# Patient Record
Sex: MALE | Race: WHITE | Employment: FULL TIME | URBAN - METROPOLITAN AREA
[De-identification: names, ages, dates, MRNs, and addresses within clinical notes are randomized per-mention and may not be internally consistent; named-entity substitution may affect disease eponyms.]

---

## 2020-05-22 ENCOUNTER — TELEPHONE (OUTPATIENT)
Dept: RHEUMATOLOGY | Facility: CLINIC | Age: 35
End: 2020-05-22

## 2020-05-22 ENCOUNTER — TELEMEDICINE (OUTPATIENT)
Dept: RHEUMATOLOGY | Facility: CLINIC | Age: 35
End: 2020-05-22
Payer: COMMERCIAL

## 2020-05-22 DIAGNOSIS — Z51.81 THERAPEUTIC DRUG MONITORING: ICD-10-CM

## 2020-05-22 DIAGNOSIS — M06.9 RHEUMATOID ARTHRITIS, INVOLVING UNSPECIFIED SITE, UNSPECIFIED RHEUMATOID FACTOR PRESENCE: Primary | ICD-10-CM

## 2020-05-22 PROCEDURE — 99204 OFFICE O/P NEW MOD 45 MIN: CPT | Performed by: INTERNAL MEDICINE

## 2020-05-22 RX ORDER — FOLIC ACID 1 MG/1
1 TABLET ORAL DAILY
Qty: 90 TABLET | Refills: 3 | Status: SHIPPED | OUTPATIENT
Start: 2020-05-22 | End: 2021-05-22

## 2020-05-22 NOTE — TELEPHONE ENCOUNTER
Future Appointments   Date Time Provider Keanu Mcclellan   5/26/2020  2:00 PM MD Jelly Shearer [facility]   6/24/2020 10:00 AM Marvel Cervantes MD [address] [place] [address]

## 2020-05-22 NOTE — PATIENT INSTRUCTIONS
1. Increase methotrexate to 7 tablets a week   2. Start folic acid 1mg a day   3. advil as needed  4.  Return to clinic in 4 weeks - June 24th at 10 am.   5. Check labs on next visit -

## 2020-05-22 NOTE — PROGRESS NOTES
This visit is conducted using Telemedicine with live, interactive video and audio. Patient understands and accepts financial responsibility for any deductible, co-insurance and/or co-pays associated with this service.     Telehealth outside of [facility]  T unspecified site, unspecified rheumatoid factor presence  Abnormal Labs: +RHEUMATOID FACTOR  .   HPI:     I had the pleasure of seeing Cayla Payne on 5/22/2020 for evaluation.      He is a pleasant 37year old who has been having ongoing polyarthralg Dispense Refill   • methotrexate 2.5 MG Oral Tab Take 12.5 mg by mouth once a week. • methylPREDNISolone (MEDROL) 4 MG Oral Tablet Therapy Pack As directed. (Patient not taking: Reported on 5/22/2020 ) 1 kit 1      History reviewed.  No pertinent past m PERRLA, pleasant, no acute distress,  no neck tendnerness, good ROM,   No rashes  CVS and RS : chest symmetrical, no dyspnea  ABD: Soft   Joint exam:   Can raise left hsoulder - tender in ac joint upon abduction   Mild tender in mcps, pips in hands , can c 6 - 22 (calc) NOT APPLICABLE   SODIUM, SERUM      135 - 146 mmol/L 137   POTASSIUM, SERUM      3.5 - 5.3 mmol/L 4.3   CHLORIDE, SERUM      98 - 110 mmol/L 104   CARBON DIOXIDE      20 - 32 mmol/L 29   CALCIUM      8.6 - 10.3 mg/dL 9.4   PROTEIN, TOTAL Mathew Toro MD  5/22/2020  1:41 PM    Please note that the following visit was completed using two-way, real-time interactive audio and/or video communication. This was done with patient consent.    This has been done in good neda to provide c

## 2020-06-24 ENCOUNTER — OFFICE VISIT (OUTPATIENT)
Dept: RHEUMATOLOGY | Facility: CLINIC | Age: 35
End: 2020-06-24
Payer: COMMERCIAL

## 2020-06-24 VITALS
HEIGHT: 64 IN | SYSTOLIC BLOOD PRESSURE: 113 MMHG | WEIGHT: 130 LBS | BODY MASS INDEX: 22.2 KG/M2 | DIASTOLIC BLOOD PRESSURE: 76 MMHG | HEART RATE: 78 BPM

## 2020-06-24 DIAGNOSIS — Z51.81 THERAPEUTIC DRUG MONITORING: ICD-10-CM

## 2020-06-24 DIAGNOSIS — E55.9 VITAMIN D DEFICIENCY: ICD-10-CM

## 2020-06-24 DIAGNOSIS — M06.9 RHEUMATOID ARTHRITIS, INVOLVING UNSPECIFIED SITE, UNSPECIFIED RHEUMATOID FACTOR PRESENCE: Primary | ICD-10-CM

## 2020-06-24 PROCEDURE — 99214 OFFICE O/P EST MOD 30 MIN: CPT | Performed by: INTERNAL MEDICINE

## 2020-06-24 RX ORDER — OMEPRAZOLE 20 MG/1
20 CAPSULE, DELAYED RELEASE ORAL
COMMUNITY
End: 2021-10-05 | Stop reason: ALTCHOICE

## 2020-06-24 NOTE — PATIENT INSTRUCTIONS
1. Stay on methotrexate 7 tablets a week   2. Cont. folic acid 1mg a day   3. advil as needed  4. Return to clinic in 3 months -   5. Check labs in 1 month -   6.  Consider sulfasalazine 500mg twice a day   Sulfasalazine tablets  Brand Names: Azulfidine, Peace is almost time for your next dose, take only that dose. Do not take double or extra doses. Where should I keep my medicine? Keep out of the reach of children. Store at room temperature between 15 and 30 degrees C (59 and 86 degrees F).  Keep container ti

## 2020-06-24 NOTE — PROGRESS NOTES
Jackie Murillo is a 37year old male who presents for Patient presents with: Follow - Up  Joint Pain  Hand Pain: swelling  Foot Pain  Shoulder Pain  . HPI:     I had the pleasure of seeing Jackie Murillo on 5/22/2020 for evaluation.      He is the beginning, the increase caused indigestion. He has felt like he is dragging. He has 1/10 pain. He has morning stiffness for a couple of hours. He has only taken ibuprofen 1-2 times ad ay.        Wt Readings from Last 2 Encounters:  06/24/20 : 13 no temple pain  Eyes: No visual changes,   CVS: No chest pain, no heart disease  RS: No SOB, no Cough, occl Pleurtic pain in chest - not really bad,   GI: No nausea, no vomiiting, no abominal pain, no hx of ulcer, getting heartburn, no dyshpagia, no BRBPR EOSINOPHILS      15 - 500 cells/uL 191   ABSOLUTE BASOPHILS      0 - 200 cells/uL 40   ABSOLUTE BLASTS      0 cells/uL CANCELED   ABSOLUTE NUCLEATED RBC      0 cells/uL CANCELED   NEUTROPHILS      % 59.3   BAND NEUTROPHILS      % CANCELED   Metamyelocytes enough   - increased to 17.5mg ad ay and fa 1mg a day - will stay at this dose for another 1 month -   - consider sulfasalazine 500mg twice a day -   The risks and benefits of sulfsalazine therapy were discussed at length with the patient, including the po

## 2020-07-29 NOTE — TELEPHONE ENCOUNTER
LOV: 6/24/20  Last Refilled:#35, 1rf 5/22/20  Labs:AST 15 ALT 11  5/11/20      Future Appointments   Date Time Provider Keanu Mcclellan   9/29/2020  2:30 PM Carlos Ladd MD [facility]       Please advise.

## 2020-10-20 NOTE — TELEPHONE ENCOUNTER
Last filled: 7/29/2020 #35 tab with 1 refill   LOV: 6/24/20  No future appointments. Labs: 7/28/20  Summary:  1. Stay on methotrexate 7 tablets a week   2. Cont. folic acid 1mg a day   3. advil as needed  4. Return to clinic in 3 months -   5.  Check labs

## 2020-11-13 ENCOUNTER — LAB ENCOUNTER (OUTPATIENT)
Dept: LAB | Age: 35
End: 2020-11-13
Attending: INTERNAL MEDICINE
Payer: COMMERCIAL

## 2020-11-13 DIAGNOSIS — Z20.828 EXPOSURE TO SARS-ASSOCIATED CORONAVIRUS: ICD-10-CM

## 2020-12-19 RX ORDER — METHOTREXATE 2.5 MG/1
TABLET ORAL
Qty: 35 TABLET | Refills: 0 | Status: SHIPPED | OUTPATIENT
Start: 2020-12-19 | End: 2021-01-11

## 2020-12-19 NOTE — TELEPHONE ENCOUNTER
Requested Prescriptions     Pending Prescriptions Disp Refills   • METHOTREXATE SODIUM 2.5 MG Oral Tab [Pharmacy Med Name: METHOTREXATE 2.5 MG TABLET] 35 tablet 0     Sig: TAKE 7 TABLETS BY MOUTH ONCE A WEEK     LF: 10/20/20 #35 TAB W/ 0 RF  LOV: 6/24/20       > OR = 60 mL/min/1.73m2 138   Bun/Creatinine Ratio      6 - 22 (calc) NOT APPLICABLE   SODIUM, SERUM      135 - 146 mmol/L 137   POTASSIUM, SERUM      3.5 - 5.3 mmol/L 4.3   CHLORIDE, SERUM      98 - 110 mmol/L 104   CARBON DIOXIDE

## 2020-12-29 PROBLEM — M13.0 POLYARTHRITIS: Status: ACTIVE | Noted: 2020-12-29

## 2020-12-29 PROBLEM — D63.8 ANEMIA OF CHRONIC DISEASE: Status: ACTIVE | Noted: 2020-12-29

## 2020-12-29 PROBLEM — M06.9 RHEUMATOID ARTHRITIS (HCC): Status: ACTIVE | Noted: 2020-12-29

## 2021-01-11 ENCOUNTER — OFFICE VISIT (OUTPATIENT)
Dept: RHEUMATOLOGY | Facility: CLINIC | Age: 36
End: 2021-01-11
Payer: COMMERCIAL

## 2021-01-11 VITALS
HEART RATE: 69 BPM | SYSTOLIC BLOOD PRESSURE: 112 MMHG | DIASTOLIC BLOOD PRESSURE: 74 MMHG | HEIGHT: 64 IN | RESPIRATION RATE: 16 BRPM | WEIGHT: 134 LBS | BODY MASS INDEX: 22.88 KG/M2

## 2021-01-11 DIAGNOSIS — M06.9 RHEUMATOID ARTHRITIS, INVOLVING UNSPECIFIED SITE, UNSPECIFIED WHETHER RHEUMATOID FACTOR PRESENT (HCC): Primary | ICD-10-CM

## 2021-01-11 DIAGNOSIS — Z51.81 THERAPEUTIC DRUG MONITORING: ICD-10-CM

## 2021-01-11 PROCEDURE — 3078F DIAST BP <80 MM HG: CPT | Performed by: INTERNAL MEDICINE

## 2021-01-11 PROCEDURE — 99214 OFFICE O/P EST MOD 30 MIN: CPT | Performed by: INTERNAL MEDICINE

## 2021-01-11 PROCEDURE — 3008F BODY MASS INDEX DOCD: CPT | Performed by: INTERNAL MEDICINE

## 2021-01-11 PROCEDURE — 3074F SYST BP LT 130 MM HG: CPT | Performed by: INTERNAL MEDICINE

## 2021-01-11 RX ORDER — METHOTREXATE 2.5 MG/1
17.5 TABLET ORAL WEEKLY
Qty: 105 TABLET | Refills: 1 | Status: SHIPPED | OUTPATIENT
Start: 2021-01-11 | End: 2021-10-05 | Stop reason: ALTCHOICE

## 2021-01-11 RX ORDER — METHYLPREDNISOLONE 4 MG/1
TABLET ORAL
Qty: 1 PACKAGE | Refills: 0 | Status: SHIPPED | OUTPATIENT
Start: 2021-01-11 | End: 2021-01-13

## 2021-01-11 NOTE — PATIENT INSTRUCTIONS
1. Stay on methotrexate 7 tablets a week   2. Cont. folic acid 1mg a day   3. advil as needed  4. Return to clinic in 4   5. Check labs  Every 3-4 months.    6. Start medrol christina   - ok to get labs at [facility] - please just let me know and me ro dr. Mere Hicks can c

## 2021-01-11 NOTE — PROGRESS NOTES
Alfonso Perez is a 38year old male who presents for Patient presents with:  Rheumatoid Arthritis  Lab Results  Medication Follow-Up  . HPI:     I had the pleasure of seeing Alfonso Perez on 5/22/2020 for evaluation.      He is a pleasant 37 y the increase caused indigestion. He has felt like he is dragging. He has 1/10 pain. He has morning stiffness for a couple of hours. He has only taken ibuprofen 1-2 times ad ay.     1/11/2021  He's feeling ok.  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[facility]:   Review Of Systems:  Fatigue  Constitutional:No fever, no change in weight or appetitie  Derm: No rashes, no 32.0 - 36.0 g/dL 33.4   RDW      11.0 - 15.0 % 12.7   Platelet Count      436 - 400 Thousand/uL 206   MPV      7.5 - 12.5 fL 10.9   NEUTROPHILS ABSOLUTE      1,500 - 7,800 cells/uL 3,914   ABSOLUTE BAND NEUTROPHILS      0 - 750 cells/uL CANCELED   ABSOLU NEGATIVE NEGATIVE   RHEUMATOID FACTOR      <14 IU/mL 105 (H)   SED RATE BY MODIFIED$WESTERGREN      < OR = 15 mm/h 2   CYCLIC CITRULLINATED$PEPTIDE (CCP) AB (IGG)      UNITS >250 (H)       Component      Latest Ref Rng & Units 12/29/2020 12/29/2020 g/dL     Albumin      3.6 - 5.1 g/dL     Globulin, Total      1.9 - 3.7 g/dL (calc)     ALBUMIN/GLOBULIN RATIO      1.0 - 2.5 (calc)     Total Bilirubin      0.2 - 1.2 mg/dL     Alkaline Phosphatase      36 - 130 U/L     AST      10 - 40 U/L     ALT (SGPT) Metamyelocytes      %  CANCELED   MYELOCYTES      %  CANCELED   PROMYELOCYTES      %  CANCELED   LYMPHOCYTES      %  22.5   REACTIVE LYMPHOCYTES      0 - 10 %  CANCELED   MONOCYTES      %  11.6   EOSINOPHILS      %  1.4   BASOPHILS      %  0.5   BLASTS 2mm/hr. tsh is 2.88  ASSESSMENT AND PLAN:   Nicky Lazaro is a 38year old male who presents for Patient presents with:  Rheumatoid Arthritis  Lab Results  Medication Follow-Up      1.  Seropositive rheumatoid arthritis - positive RF and CCP   [place]

## 2021-09-27 ENCOUNTER — TELEPHONE (OUTPATIENT)
Dept: RHEUMATOLOGY | Facility: CLINIC | Age: 36
End: 2021-09-27

## 2021-09-27 NOTE — TELEPHONE ENCOUNTER
Attempted to call no answer. Patient can complete all the labs at any [place] locations. There are standing orders in place. No fasting required.

## 2021-09-27 NOTE — TELEPHONE ENCOUNTER
Pt calling asking if he should complete all the labs on his chart currently and if Dr needs him to do any other labs. Please advise.

## 2021-10-11 ENCOUNTER — OFFICE VISIT (OUTPATIENT)
Dept: RHEUMATOLOGY | Facility: CLINIC | Age: 36
End: 2021-10-11
Payer: COMMERCIAL

## 2021-10-11 VITALS
SYSTOLIC BLOOD PRESSURE: 99 MMHG | HEART RATE: 65 BPM | WEIGHT: 132 LBS | DIASTOLIC BLOOD PRESSURE: 69 MMHG | RESPIRATION RATE: 16 BRPM | HEIGHT: 64 IN | BODY MASS INDEX: 22.53 KG/M2

## 2021-10-11 DIAGNOSIS — M06.9 RHEUMATOID ARTHRITIS, INVOLVING UNSPECIFIED SITE, UNSPECIFIED WHETHER RHEUMATOID FACTOR PRESENT (HCC): Primary | ICD-10-CM

## 2021-10-11 DIAGNOSIS — Z51.81 THERAPEUTIC DRUG MONITORING: ICD-10-CM

## 2021-10-11 DIAGNOSIS — D72.819 LEUKOPENIA, UNSPECIFIED TYPE: ICD-10-CM

## 2021-10-11 PROCEDURE — 3078F DIAST BP <80 MM HG: CPT | Performed by: INTERNAL MEDICINE

## 2021-10-11 PROCEDURE — 3074F SYST BP LT 130 MM HG: CPT | Performed by: INTERNAL MEDICINE

## 2021-10-11 PROCEDURE — 3008F BODY MASS INDEX DOCD: CPT | Performed by: INTERNAL MEDICINE

## 2021-10-11 PROCEDURE — 99214 OFFICE O/P EST MOD 30 MIN: CPT | Performed by: INTERNAL MEDICINE

## 2021-10-11 RX ORDER — METHYLPREDNISOLONE 4 MG/1
TABLET ORAL
Qty: 1 EACH | Refills: 0 | Status: SHIPPED | OUTPATIENT
Start: 2021-10-11 | End: 2021-11-03

## 2021-10-11 NOTE — PROGRESS NOTES
Cayla Payne is a 43year old male who presents for Patient presents with:  Rheumatoid Arthritis  Lab Results  Joint Pain: hands, feet, elbows, and wrist  .   HPI:     I had the pleasure of seeing Alexandra Kapoor on 5/22/2020 for evaluation. tired.   In the beginning, the increase caused indigestion. He has felt like he is dragging. He has 1/10 pain. He has morning stiffness for a couple of hours. He has only taken ibuprofen 1-2 times ad ay.     1/11/2021  He's feeling ok.  But the last Smoker        Packs/day: 0.50        Years: 15.00        Pack years: 7.5      Smokeless tobacco: Never Used    Vaping Use      Vaping Use: Never used    Alcohol use:  Yes      Alcohol/week: 4.0 standard drinks      Types: 4 Standard drinks or equivalent per Rng & Units 5/11/2020   WHITE BLOOD CELL COUNT      3.8 - 10.8 Thousand/uL 6.6   RED BLOOD CELL COUNT      4.20 - 5.80 Million/uL 5.10   Hemoglobin      13.2 - 17.1 g/dL 15.0   Hematocrit      38.5 - 50.0 % 44.9   MCV      80.0 - 100.0 fL 88.0   MCH      2 3.7 g/dL (calc) 2.5   ALBUMIN/GLOBULIN RATIO      1.0 - 2.5 (calc) 1.7   Total Bilirubin      0.2 - 1.2 mg/dL 0.9   Alkaline Phosphatase      36 - 130 U/L 65   AST      10 - 40 U/L 15   ALT (SGPT)      9 - 46 U/L 11   C-REACTIVE PROTEIN      <8.0 mg/L 7.1 mL/min/1.73m2     Bun/Creatinine Ratio      6 - 22 (calc)     SODIUM, SERUM      135 - 146 mmol/L     POTASSIUM, SERUM      3.5 - 5.3 mmol/L     CHLORIDE, SERUM      98 - 110 mmol/L     CARBON DIOXIDE      20 - 32 mmol/L     CALCIUM      8.6 - 10.3 mg/dL MONOCYTES      200 - 950 cells/uL  650   ABSOLUTE EOSINOPHILS      15 - 500 cells/uL  78   ABSOLUTE BASOPHILS      0 - 200 cells/uL  28   ABSOLUTE BLASTS      0 cells/uL  CANCELED   ABSOLUTE NUCLEATED RBC      0 cells/uL  CANCELED   NEUTROPHILS      %  64 (H)   SED RATE BY MODIFIED$WESTERGREN      < OR = 15 mm/h  2   C-REACTIVE PROTEIN      <8.0 mg/L  4.9     Component      Latest Ref Rng & Units 10/5/2021   WHITE BLOOD CELL COUNT      3.8 - 10.8 Thousand/uL 3.2 (L)   RED BLOOD CELL COUNT      4.20 - 5.80 M 21   CALCIUM      8.6 - 10.3 mg/dL 8.7   PROTEIN, TOTAL      6.1 - 8.1 g/dL 6.3   Albumin      3.6 - 5.1 g/dL 3.8   Globulin, Total      1.9 - 3.7 g/dL (calc) 2.5   ALBUMIN/GLOBULIN RATIO      1.0 - 2.5 (calc) 1.5   Total Bilirubin      0.2 - 1.2 mg/dL 0.4 return to clinic in 6-8 weeks.     - d/w him about biologis as well - had long d/w him that if he wants - can consider this - but he is comfortable  restarting methotrexate since it worked for him - will try a lower dose to avoid SE of nausea  - asked him t

## 2021-10-11 NOTE — PATIENT INSTRUCTIONS
1. Medrol christina   2. Repeat wbc  Count. In 3 weeks. 3. If normal then will restart methotrexate 4 tablets a week x 2 weeks, then 6 tablets a week   4. Return to clinic in 6-8 weeks. 5. Check labs again in 8 weeks.

## 2021-11-01 RX ORDER — METHYLPREDNISOLONE 4 MG/1
TABLET ORAL
Qty: 1 EACH | Refills: 0 | OUTPATIENT
Start: 2021-11-01

## 2021-11-03 RX ORDER — METHYLPREDNISOLONE 4 MG/1
TABLET ORAL
Qty: 1 EACH | Refills: 0 | Status: SHIPPED | OUTPATIENT
Start: 2021-11-03 | End: 2021-11-24 | Stop reason: ALTCHOICE

## 2021-11-03 NOTE — TELEPHONE ENCOUNTER
Please let him know I sent in a medrol christina -   He should get labs at least 2 weeks after taking this

## 2021-11-10 RX ORDER — METHYLPREDNISOLONE 4 MG/1
TABLET ORAL
Qty: 21 EACH | Refills: 0 | OUTPATIENT
Start: 2021-11-10

## 2021-12-20 ENCOUNTER — OFFICE VISIT (OUTPATIENT)
Dept: RHEUMATOLOGY | Facility: CLINIC | Age: 36
End: 2021-12-20
Payer: COMMERCIAL

## 2021-12-20 VITALS
HEART RATE: 81 BPM | DIASTOLIC BLOOD PRESSURE: 76 MMHG | SYSTOLIC BLOOD PRESSURE: 114 MMHG | BODY MASS INDEX: 22.88 KG/M2 | HEIGHT: 64 IN | WEIGHT: 134 LBS

## 2021-12-20 DIAGNOSIS — Z51.81 THERAPEUTIC DRUG MONITORING: ICD-10-CM

## 2021-12-20 DIAGNOSIS — M06.9 RHEUMATOID ARTHRITIS, INVOLVING UNSPECIFIED SITE, UNSPECIFIED WHETHER RHEUMATOID FACTOR PRESENT (HCC): Primary | ICD-10-CM

## 2021-12-20 PROCEDURE — 3078F DIAST BP <80 MM HG: CPT | Performed by: INTERNAL MEDICINE

## 2021-12-20 PROCEDURE — 3008F BODY MASS INDEX DOCD: CPT | Performed by: INTERNAL MEDICINE

## 2021-12-20 PROCEDURE — 99214 OFFICE O/P EST MOD 30 MIN: CPT | Performed by: INTERNAL MEDICINE

## 2021-12-20 PROCEDURE — 3074F SYST BP LT 130 MM HG: CPT | Performed by: INTERNAL MEDICINE

## 2021-12-20 RX ORDER — PREDNISONE 1 MG/1
TABLET ORAL
Qty: 37 TABLET | Refills: 0 | Status: SHIPPED | OUTPATIENT
Start: 2021-12-20

## 2021-12-20 RX ORDER — METHYLPREDNISOLONE 4 MG/1
TABLET ORAL
Qty: 1 EACH | Refills: 0 | Status: SHIPPED | OUTPATIENT
Start: 2021-12-20

## 2021-12-20 RX ORDER — FOLIC ACID 1 MG/1
1 TABLET ORAL DAILY
Qty: 90 TABLET | Refills: 3 | Status: SHIPPED | OUTPATIENT
Start: 2021-12-20 | End: 2022-12-20

## 2021-12-20 NOTE — PATIENT INSTRUCTIONS
1.  Will plan on enbrel 50mg a week - info given    2. Cont. methotrexate 7 tablets a week   3. Return to clinic in 8-12 weeks. 4 Check labs again in 8 -12weeks.    5.medrol christina, then  Prednisone 10mg a day x 1 week, then 5mg a day x 3 weeks, then off missed dose. Return to your normal dosing schedule. Do not take 2 doses of this medicine at one time. Please tell your doctor and pharmacist about all the medicines you take. Include both prescription and over-the-counter medicines.  Also tell them about a stop any other medicines without first speaking to your doctor or pharmacist.  Call your doctor right away if you notice any unusual bleeding or bruising. Used needles and syringes should be thrown away properly in a medical waste container.  Ask your doct tiredness or weakness  · unsteadiness while walking  · difficulty or discomfort urinating  · blurring or changes of vision  A few people may have an allergic reactions to this medicine.  Symptoms can include difficulty breathing, skin rash, itching, swellin

## 2021-12-20 NOTE — PROGRESS NOTES
Ana M Senior is a 43year old male who presents for Patient presents with:  Medication Follow-Up: 2 weeks on Methotrexate  Joint Pain  Test Results  . HPI:     I had the pleasure of seeing Ana M Senior on 5/22/2020 for evaluation.      He is the beginning, the increase caused indigestion. He has felt like he is dragging. He has 1/10 pain. He has morning stiffness for a couple of hours. He has only taken ibuprofen 1-2 times ad ay.     1/11/2021  He's feeling ok.  But the last 2-3 weeks h celecoxib (CELEBREX) 200 MG Oral Cap Take 1 capsule (200 mg total) by mouth 2 (two) times daily. 60 capsule 1      No past medical history on file. No past surgical history on file.    Family History   Problem Relation Age of Onset   • Cancer Father    • Soft abd,   Joint exam:   B/l shoulders tender at this time , can raise them but with difficulty   he has mild  swellign in left 2nd and 3rd pip.   Has mild swelling in right 4th pips -    Can close hands but very stiff - not tender right now - janeth suazo eGFR       > OR = 60 mL/min/1.73m2 138   Bun/Creatinine Ratio      6 - 22 (calc) NOT APPLICABLE   SODIUM, SERUM      135 - 146 mmol/L 137   POTASSIUM, SERUM      3.5 - 5.3 mmol/L 4.3   CHLORIDE, SERUM      98 - 110 mmol/L 104   [place] NEUTROPHILS      % CANCELED   Metamyelocytes      % CANCELED   MYELOCYTES      % CANCELED   PROMYELOCYTES      % CANCELED   LYMPHOCYTES      % 31.5   REACTIVE LYMPHOCYTES      0 - 10 % CANCELED   MONOCYTES      % 15.7   EOSINOPHILS      % 2.5   BASOPHILS 10.8 Thousand/uL 5.2   RBC      4.20 - 5.80 Million/uL 4.68   Hemoglobin      13.2 - 17.1 g/dL 13.9   Hematocrit      38.5 - 50.0 % 41.0   MCV      80.0 - 100.0 fL 87.6   MCH      27.0 - 33.0 pg 29.7   MCHC      32.0 - 36.0 g/dL 33.9   RDW      11.0 - 15. 0 0.2 - 1.2 mg/dL 0.7   Alkaline Phosphatase      36 - 130 U/L 73   AST (SGOT)      10 - 40 U/L 12   ALT (SGPT)      9 - 46 U/L 8 (L)   IRON, TOTAL      50 - 180 mcg/dL 83   FERRITIN      38 - 380 ng/mL 195   HEMOGLOBIN A1c      <5.7 % of total Hgb 5.3   Vi Summary:  1. Will plan on enbrel 50mg a week - info given - he wants to try this after 2 months. 2. Cont. methotrexate 7 tablets a week   3. Return to clinic in 8 -12weeks. 4 Check labs again in 8 -12 weeks.    5.medrol christina, then  Prednisone 10mg

## 2022-10-13 ENCOUNTER — LAB ENCOUNTER (OUTPATIENT)
Dept: LAB | Age: 37
End: 2022-10-13
Attending: INTERNAL MEDICINE
Payer: COMMERCIAL

## 2022-10-13 DIAGNOSIS — R53.83 OTHER FATIGUE: ICD-10-CM

## 2022-10-13 DIAGNOSIS — E53.8 VITAMIN B12 DEFICIENCY: ICD-10-CM

## 2022-10-13 DIAGNOSIS — M05.79 RHEUMATOID ARTHRITIS INVOLVING MULTIPLE SITES WITH POSITIVE RHEUMATOID FACTOR (HCC): ICD-10-CM

## 2022-10-13 DIAGNOSIS — R10.9 INTRACTABLE ABDOMINAL PAIN: ICD-10-CM

## 2022-10-13 LAB
ALBUMIN SERPL-MCNC: 3.7 G/DL (ref 3.4–5)
ALBUMIN/GLOB SERPL: 1.1 {RATIO} (ref 1–2)
ALP LIVER SERPL-CCNC: 65 U/L
ALT SERPL-CCNC: 20 U/L
ANION GAP SERPL CALC-SCNC: 8 MMOL/L (ref 0–18)
AST SERPL-CCNC: 14 U/L (ref 15–37)
BASOPHILS # BLD AUTO: 0.02 X10(3) UL (ref 0–0.2)
BASOPHILS NFR BLD AUTO: 0.4 %
BILIRUB SERPL-MCNC: 1 MG/DL (ref 0.1–2)
BUN BLD-MCNC: 8 MG/DL (ref 7–18)
BUN/CREAT SERPL: 13.1 (ref 10–20)
CALCIUM BLD-MCNC: 8.4 MG/DL (ref 8.5–10.1)
CHLORIDE SERPL-SCNC: 109 MMOL/L (ref 98–112)
CHOLEST SERPL-MCNC: 129 MG/DL (ref ?–200)
CO2 SERPL-SCNC: 24 MMOL/L (ref 21–32)
CREAT BLD-MCNC: 0.61 MG/DL
CRP SERPL-MCNC: <0.29 MG/DL (ref ?–0.3)
DEPRECATED RDW RBC AUTO: 41.8 FL (ref 35.1–46.3)
EOSINOPHIL # BLD AUTO: 0.04 X10(3) UL (ref 0–0.7)
EOSINOPHIL NFR BLD AUTO: 0.7 %
ERYTHROCYTE [DISTWIDTH] IN BLOOD BY AUTOMATED COUNT: 12.9 % (ref 11–15)
ERYTHROCYTE [SEDIMENTATION RATE] IN BLOOD: 7 MM/HR
FASTING PATIENT LIPID ANSWER: YES
FASTING STATUS PATIENT QL REPORTED: YES
GFR SERPLBLD BASED ON 1.73 SQ M-ARVRAT: 125 ML/MIN/1.73M2 (ref 60–?)
GLOBULIN PLAS-MCNC: 3.4 G/DL (ref 2.8–4.4)
GLUCOSE BLD-MCNC: 104 MG/DL (ref 70–99)
HBV CORE AB SERPL QL IA: NONREACTIVE
HBV SURFACE AG SER-ACNC: <0.1 [IU]/L
HBV SURFACE AG SERPL QL IA: NONREACTIVE
HCT VFR BLD AUTO: 41.5 %
HCV AB SERPL QL IA: NONREACTIVE
HDLC SERPL-MCNC: 41 MG/DL (ref 40–59)
HGB BLD-MCNC: 14.2 G/DL
IMM GRANULOCYTES # BLD AUTO: 0.01 X10(3) UL (ref 0–1)
IMM GRANULOCYTES NFR BLD: 0.2 %
LDLC SERPL CALC-MCNC: 70 MG/DL (ref ?–100)
LYMPHOCYTES # BLD AUTO: 1.48 X10(3) UL (ref 1–4)
LYMPHOCYTES NFR BLD AUTO: 27.6 %
MCH RBC QN AUTO: 30.6 PG (ref 26–34)
MCHC RBC AUTO-ENTMCNC: 34.2 G/DL (ref 31–37)
MCV RBC AUTO: 89.4 FL
MONOCYTES # BLD AUTO: 0.42 X10(3) UL (ref 0.1–1)
MONOCYTES NFR BLD AUTO: 7.8 %
NEUTROPHILS # BLD AUTO: 3.4 X10 (3) UL (ref 1.5–7.7)
NEUTROPHILS # BLD AUTO: 3.4 X10(3) UL (ref 1.5–7.7)
NEUTROPHILS NFR BLD AUTO: 63.3 %
NONHDLC SERPL-MCNC: 88 MG/DL (ref ?–130)
OSMOLALITY SERPL CALC.SUM OF ELEC: 291 MOSM/KG (ref 275–295)
PLATELET # BLD AUTO: 225 10(3)UL (ref 150–450)
POTASSIUM SERPL-SCNC: 3.4 MMOL/L (ref 3.5–5.1)
PROT SERPL-MCNC: 7.1 G/DL (ref 6.4–8.2)
RBC # BLD AUTO: 4.64 X10(6)UL
SODIUM SERPL-SCNC: 141 MMOL/L (ref 136–145)
TRIGL SERPL-MCNC: 92 MG/DL (ref 30–149)
TSI SER-ACNC: 1.02 MIU/ML (ref 0.36–3.74)
VIT B12 SERPL-MCNC: 567 PG/ML (ref 193–986)
VLDLC SERPL CALC-MCNC: 14 MG/DL (ref 0–30)
WBC # BLD AUTO: 5.4 X10(3) UL (ref 4–11)

## 2022-10-13 PROCEDURE — 86480 TB TEST CELL IMMUN MEASURE: CPT | Performed by: INTERNAL MEDICINE

## 2022-10-13 PROCEDURE — 85652 RBC SED RATE AUTOMATED: CPT | Performed by: INTERNAL MEDICINE

## 2022-10-13 PROCEDURE — 86704 HEP B CORE ANTIBODY TOTAL: CPT | Performed by: INTERNAL MEDICINE

## 2022-10-13 PROCEDURE — 87340 HEPATITIS B SURFACE AG IA: CPT | Performed by: INTERNAL MEDICINE

## 2022-10-13 PROCEDURE — 80053 COMPREHEN METABOLIC PANEL: CPT

## 2022-10-13 PROCEDURE — 85025 COMPLETE CBC W/AUTO DIFF WBC: CPT

## 2022-10-13 PROCEDURE — 86803 HEPATITIS C AB TEST: CPT | Performed by: INTERNAL MEDICINE

## 2022-10-13 PROCEDURE — 84443 ASSAY THYROID STIM HORMONE: CPT

## 2022-10-13 PROCEDURE — 86140 C-REACTIVE PROTEIN: CPT | Performed by: INTERNAL MEDICINE

## 2022-10-13 PROCEDURE — 36415 COLL VENOUS BLD VENIPUNCTURE: CPT | Performed by: INTERNAL MEDICINE

## 2022-10-13 PROCEDURE — 82607 VITAMIN B-12: CPT

## 2022-10-13 PROCEDURE — 80061 LIPID PANEL: CPT

## 2022-10-17 LAB
M TB IFN-G CD4+ T-CELLS BLD-ACNC: 0.04 IU/ML
M TB TUBERC IFN-G BLD QL: NEGATIVE
M TB TUBERC IGNF/MITOGEN IGNF CONTROL: >10 IU/ML
QFT TB1 AG MINUS NIL: 0.01 IU/ML
QFT TB2 AG MINUS NIL: 0.01 IU/ML

## 2022-10-20 ENCOUNTER — HOSPITAL ENCOUNTER (OUTPATIENT)
Dept: ULTRASOUND IMAGING | Age: 37
Discharge: HOME | End: 2022-10-20
Attending: INTERNAL MEDICINE
Payer: COMMERCIAL

## 2022-10-20 DIAGNOSIS — R10.9 INTRACTABLE ABDOMINAL PAIN: ICD-10-CM

## 2022-10-20 PROCEDURE — 76700 US EXAM ABDOM COMPLETE: CPT | Performed by: INTERNAL MEDICINE

## 2023-01-25 ENCOUNTER — TELEPHONE (OUTPATIENT)
Dept: RHEUMATOLOGY | Facility: CLINIC | Age: 38
End: 2023-01-25

## 2023-01-25 NOTE — TELEPHONE ENCOUNTER
Pt requesting a video visit with Dr Daniel Fisher. Please advise if okay to do so for followup on labs.   Call pt at: [phone]

## 2023-02-07 ENCOUNTER — TELEPHONE (OUTPATIENT)
Dept: RHEUMATOLOGY | Facility: CLINIC | Age: 38
End: 2023-02-07

## 2023-02-07 ENCOUNTER — TELEMEDICINE (OUTPATIENT)
Dept: RHEUMATOLOGY | Facility: CLINIC | Age: 38
End: 2023-02-07

## 2023-02-07 DIAGNOSIS — D72.819 LEUKOPENIA, UNSPECIFIED TYPE: ICD-10-CM

## 2023-02-07 DIAGNOSIS — Z51.81 THERAPEUTIC DRUG MONITORING: ICD-10-CM

## 2023-02-07 DIAGNOSIS — M06.9 RHEUMATOID ARTHRITIS, INVOLVING UNSPECIFIED SITE, UNSPECIFIED WHETHER RHEUMATOID FACTOR PRESENT (HCC): Primary | ICD-10-CM

## 2023-02-07 PROCEDURE — 99214 OFFICE O/P EST MOD 30 MIN: CPT | Performed by: INTERNAL MEDICINE

## 2023-02-07 NOTE — PATIENT INSTRUCTIONS
Summary:  1. Plan on starting humira 40mg every 2 weeks. 2. Cont. methotrexate 7 tablets a week   3. Return to clinic in 8 weeks   4 Check labs again in 8 weeks.

## 2023-02-13 NOTE — TELEPHONE ENCOUNTER
Component      Latest Ref Rng & Units 10/13/2022   Quantiferon TB NIL      IU/mL 0.04   Quantiferon-TB1 Minus NIL      IU/mL 0.01   Quantiferon-TB2 Minus NIL      IU/mL 0.01   Quantiferon TB Mitogen minus NIL      IU/mL >10.00   QTB. RESULT      Negative Negative   HBSAg Screen      Nonreactive  Nonreactive   Hbsag Screen Index       <0.10   HCV AB      Nonreactive  Nonreactive   HEPATITIS B CORE AB, TOTAL      Nonreactive  Nonreactive

## 2023-03-01 RX ORDER — ADALIMUMAB 40MG/0.4ML
40 KIT SUBCUTANEOUS
Qty: 2 EACH | Refills: 0 | Status: SHIPPED | OUTPATIENT
Start: 2023-03-01

## 2023-03-01 NOTE — TELEPHONE ENCOUNTER
PA for humira approved. Pended script. Will contact to set up teaching once approved by provider.            Prior authorization for: humira    Medication form: 2 pens / 28 day    Date received: 3/1/23    Approval #: [id number]    Approved dates: 3/1/23 - 3/1/24    Pharmacy for medication:

## 2023-03-01 NOTE — TELEPHONE ENCOUNTER
Scheduled education with RN. Informed of Copay card and to get through [address] . He will contact us with any other questions.      Future Appointments   Date Time Provider Keanu Mcclellan   3/8/2023  9:00 AM JOHN GOETZ 2ND FLR RN [facility] [place]

## 2023-03-08 ENCOUNTER — NURSE ONLY (OUTPATIENT)
Dept: RHEUMATOLOGY | Facility: CLINIC | Age: 38
End: 2023-03-08

## 2023-03-08 DIAGNOSIS — M06.9 RHEUMATOID ARTHRITIS, INVOLVING UNSPECIFIED SITE, UNSPECIFIED WHETHER RHEUMATOID FACTOR PRESENT (HCC): Primary | ICD-10-CM

## 2023-03-08 DIAGNOSIS — Z51.81 ENCOUNTER FOR THERAPEUTIC DRUG MONITORING: ICD-10-CM

## 2023-03-08 PROCEDURE — 99211 OFF/OP EST MAY X REQ PHY/QHP: CPT | Performed by: INTERNAL MEDICINE

## 2023-03-08 RX ORDER — ADALIMUMAB 40MG/0.4ML
40 KIT SUBCUTANEOUS ONCE
Qty: 1 EACH | Refills: 0 | COMMUNITY
Start: 2023-03-08 | End: 2023-03-08

## 2023-03-08 NOTE — PROGRESS NOTES
Patient came in for [facility] teaching. Name and  verified. Patient educated on proper handling/storage/disposal of medications. Patient informed of proper injection and aseptic technique. Patient educated on potential side effects. Educational materials given to patient on medication. Patient practiced several times with demo pen with great technique. Patient then injected in to right lower abdomen with excellent technique under RN supervision. Patient tolerated medication well. Patient questions and concerns answered. Patient instructed to call us with any further questions. Patient activated copay card and was instructed to provide pharmacy with card infomation. Patient stated medication is to be delivered from pharmacy. 30 min spent in education session. Patient aware to complete monitoring labs in 4 to 6 weeks and then follow up with provider. [id number] EXP [id number] was sample provided to patient in office.

## 2023-04-19 RX ORDER — ADALIMUMAB 40MG/0.4ML
40 KIT SUBCUTANEOUS
Qty: 2 EACH | Refills: 5 | Status: SHIPPED | OUTPATIENT
Start: 2023-04-19

## 2023-07-14 ENCOUNTER — TELEMEDICINE (OUTPATIENT)
Dept: RHEUMATOLOGY | Facility: CLINIC | Age: 38
End: 2023-07-14

## 2023-07-14 DIAGNOSIS — Z51.81 ENCOUNTER FOR THERAPEUTIC DRUG MONITORING: ICD-10-CM

## 2023-07-14 DIAGNOSIS — M06.9 RHEUMATOID ARTHRITIS, INVOLVING UNSPECIFIED SITE, UNSPECIFIED WHETHER RHEUMATOID FACTOR PRESENT (HCC): Primary | ICD-10-CM

## 2023-07-14 PROCEDURE — 99214 OFFICE O/P EST MOD 30 MIN: CPT | Performed by: INTERNAL MEDICINE

## 2023-07-14 RX ORDER — PREDNISONE 5 MG/1
TABLET ORAL
Qty: 21 TABLET | Refills: 0 | Status: SHIPPED | OUTPATIENT
Start: 2023-07-14 | End: 2023-07-14

## 2023-07-14 RX ORDER — CELECOXIB 200 MG/1
200 CAPSULE ORAL 2 TIMES DAILY
Qty: 60 CAPSULE | Refills: 1 | Status: SHIPPED | OUTPATIENT
Start: 2023-07-14

## 2023-07-14 RX ORDER — PREDNISONE 5 MG/1
TABLET ORAL
Qty: 21 TABLET | Refills: 0 | Status: SHIPPED | OUTPATIENT
Start: 2023-07-14

## 2023-07-14 NOTE — PATIENT INSTRUCTIONS
1. Cont. humira 40mg every 2 weeks. For now   2. Likely infection flared you - ok to take prednisone - Take 6 tabsx 1 day, take 5 tabsx 1 day, take 4 tabsx 1 day,take 3 tabsx 1 day, take 2 tabsx 1 day, take 1 tabsx 1 day, then off  3. Ok to cont. Celebrex 200mg a day -   4. Check labs in 3 weeks. esr, crp and cbc   5. Return to clinci in 3-6 months.   - if pain is still not better in 1-4 weeks, then can try to increase humira dosing.

## 2023-07-14 NOTE — PROGRESS NOTES
This visit is conducted using Telemedicine with live, interactive video and audio. Patient has been referred to the [facility] website at [email or web address] to review the yearly Consent to Treat document. Patient understands and accepts financial responsibility for any deductible, co-insurance and/or co-pays associated with this service. Maddy Herrera is a 42year old male who presents for No chief complaint on file. Say Perdomo HPI:     I had the pleasure of seeing Maddy Herrera on 5/22/2020 for evaluation. He is a pleasant 37year old who has been having ongoing polyarthralgia. he has positive RF and CCP. He was referred by Dr. Candelaria Murcia. It started a couple of years ago. It was mostly hands and swollen fingers with limited mobility . it was elbows ,knees and ankels . a little bit in his shoulders. He was seeing Dr. Tanya Clifton a couple of years ago. He was put on prednisone and this alleviated his pain. He was alsos started on methorexate 12.5mg a week. Say Perdomo He was sent to a rheumatologist, Dr. Tank Aldana. He wasn't sure if it was rheumatoid arthritis. He hasn't cont. Prednisone. In Decemeber 2019, he started getting the pain again really bad. He was supposed to see a rheumatolgoist around 2/2020. His dad passed away then and he missed that appointment. Recently he reached out to dr. Candelaria Murcia. He got a medrol christina. He finshed medrol christina last week. He's taking 2 ibuporfen. He has about 6/10 pain in the morning. He has 3-4/10 pain now. His shoulders duane left shoulder 7-8/10 and hands are hurting today. His left 2nd and 3rd pips are swollen and tender. Dr. Miracle Navarro did the original labs for that. He was told that the rheumatoid factor was positive. He is tired latealy. Never had side effects with mtx  for fatigue. He feels the methotrexate was  Controlling his pain up until in 12/2019. He's taking ibuprofen 200mg - 2 daily prn - it's not daily but often.        When he gets a flare - he gets a low grade fever 99. Last night he was 99. 6. he's monitoring it daily. It's fine in the morning. He's had a flare in 12/2019 and recently this year he's had two.     6/24/2020  Pain wise and he is better on mtx 7 tablets a week. He has still felt discomfort and he feels tired. In the beginning, the increase caused indigestion. He has felt like he is dragging. He has 1/10 pain. He has morning stiffness for a couple of hours. He has only taken ibuprofen 1-2 times ad ay.     1/11/2021  He's feeling ok. But the last 2-3 weeks he has a bad flare up. Mostly it was more pain and swelling in his hands. He took ibuporfen and that helped. He wasn't incapacitated. He's been overall pretty stable on methotrexate  7 tablts aw [place]. He felt limited movement in his hands. He has 4/10 pain. Sometimes e gets heart burn. He's taking celebrex 200mg daily. hes' skipping this when hes' taking ibuporfen. 400mg once a day -     10/11/2021  He ended up stopping the methotrexate and stopped for a couple of weeks. He wasn't feeling bad for a while - so he continued to stop at end of 2/2021. He was a little nauseated on methotrexate and fatigued. Then he started getting the pain back in the last 1 1/2 -2 months. He has about 7/10 pain. Today . It gets better as the day goes one. He's been taking advil and then switched to celebrex twice a day. His sugasr is 80 - he was on a candy binge the last 1 month. -     12/20/2021  He is back on methotrexate and he was put back on methtorexate 7 tablets a week x 2 weeks. He did take medrol christina on 11/24/2201 -   And he is in pain for the last 4 days hurting. Gavin in his jodi. He's taking celebrex daily    D/w him about biologis -   He is  and  - he doesn't want to get the vaccine. D/w him about risk and benefits of treatment       2/7/2023  VIDEO VISIT  He is still on methotrexate. It was doing it's job.  He always gets one day post methotrxate - of dizziness and fatigue   But recently he is having more pain - like  last week and it's coming and going more often. He has a lot of hand pain . His shoulders , knees and his ankles at times. He was hesitant to get the covid vaccine and steered away the from the En[place]. Today he has no pain but the past couple of weeks it was unbearable. He was taking advil and that helping. He has eliminated alcohol altogether -     The past 2-3 weeks is overwhelming   No recent infections - but his wbc is lower -   He is feeling the methotreate is not working like it did and is interested in trying biolgoics now -     7/14/2023   VIDEO VISIT  He feels the [place] was working for him well. Last week he had noticed more discomfrot - it was unbearable . 2 nights ago he could barely walk. He started going to the gym and thought that was what was wrong. About 3 weeks it's tarted progressing worse. Over the weekend he felt feverish. He thought it was exercise related and Monday morning - he had bad dysuria. Told he had a bladder and kidney infection. He took his last abs today. He took a zpak. His joitn pain is better- he took advil with celerex wednessday  He's still a little swollen still. And takig more celebrex. He has 4/10 pain - it's not back in his hands. He can manage. His ankles and knees, and back fo his neck was really bad  The peak of the pain was 2 days ago. He       Wt Readings from Last 2 Encounters:  01/26/23 : 141 lb 6.4 oz (64.1 kg)  10/27/22 : 136 lb (61.7 kg)    There is no height or weight on file to calculate BMI. Current Outpatient Medications   Medication Sig Dispense Refill    azithromycin (ZITHROMAX Z-MIMI) 250 MG Oral Tab Take 2 tablets (500 mg total) by mouth daily for 1 day, THEN 1 tablet (250 mg total) daily for 4 days. 6 tablet 0    Adalimumab (HUMIRA PEN) 40 MG/0.4ML Subcutaneous Pen-injector Kit Inject 40 mg into the skin every 14 (fourteen) days. 2 each 5    celecoxib (CELEBREX) 200 MG Oral Cap Take 1 capsule (200 mg total) by mouth 2 (two) times daily. 60 capsule 1    METHOTREXATE 2.5 MG Oral Tab TAKE 7 TABLETS BY MOUTH ONCE A WEEK 90 tablet 2      No past medical history on file. No past surgical history on file. Family History   Problem Relation Age of Onset    Cancer Father     Gastro-Intestinal Disorder Father    no fh of rheumatoid  2 older brothers and 1 sister    Social History:  Social History     Socioeconomic History    Marital status:    Tobacco Use    Smoking status: Every Day     Packs/day: 0.50     Years: 15.00     Pack years: 7.50     Types: Cigarettes    Smokeless tobacco: Never   Vaping Use    Vaping Use: Never used   Substance and Sexual Activity    Alcohol use:  Yes     Alcohol/week: 4.0 standard drinks of alcohol     Types: 4 Standard drinks or equivalent per week   Other Topics Concern    Caffeine Concern No    Exercise No    Seat Belt No    Special Diet No    Stress Concern No    Weight Concern No      ,   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[id number]:   Review Of Systems:  Fatigue  Constitutional:No fever, no change in weight or appetitie  Derm: No rashes, no oral ulcers, no alopecia, no photosensitivity, no psoriasis  HEENT: No dry eyes, no dry mouth, no Raynaud's, no nasal ulcers, no parotid swelling, no neck pain, no jaw pain, no temple pain  Eyes: No visual changes,   CVS: No chest pain, no heart disease  RS: No SOB, no Cough, occl Pleurtic pain in chest - not really bad,   GI: No nausea, no vomiiting, no abominal pain, no hx of ulcer, getting heartburn, no dyshpagia, no BRBPR or melena  : no  Dysuria - 8 months ago had blood in urine - it's better now. , hx of kidney stones   Neuro: tingilng in his feet,  numbness or tingling, no headache, no hx of seizures,   Psych: no hx of anxiety or depression  ENDO: no hx of thyroid disease, no hx of DM  Joint/Muscluskeltal: see HPI,   All other ROS are negative. EXAM:   There were no vitals taken for this visit. HEENT: EOM intact, clear sclear, PERRLA, pleasant, no acute distress,  no neck tendnerness, good ROM,   No rashes  CVS:chest symmetrical   RS: No dyspnea  ABD: Soft abd,   Joint exam:   B/l shoulders mild tender at this time , can raise them but with difficulty   he has mild  swellign in left 2nd and 3rd pip. Has mild swelling in right 4th pips -    Can close hands but very stiff - not tender right now - descirbes difficutly in the morning.    No wrist tenderness  No elbow tendenress  No knee or ankle tenderness  B/l squeeze test positive -   No no edema            Component      Latest Ref Rng & Units 1/26/2023 10/13/2022   WBC      3.8 - 10.8 Thousand/uL 3.3 (L) 5.4   RBC      4.20 - 5.80 Million/uL 4.58 4.64   Hemoglobin      13.2 - 17.1 g/dL 13.4 14.2   Hematocrit      38.5 - 50.0 % 40.2 41.5   MCV      80.0 - 100.0 fL 87.8 89.4   MCH      27.0 - 33.0 pg 29.3 30.6   MCHC      32.0 - 36.0 g/dL 33.3 34.2   RDW      11.0 - 15.0 % 13.3 12.9   Platelet Count      095 - 400 Thousand/uL 198 225.0   MPV      7.5 - 12.5 fL 10.3    Neutrophils Absolute      1,500 - 7,800 cells/uL 1,419 (L) 3.40   ABSOLUTE BAND NEUTROPHILS      0 - 750 cells/uL CANCELED    ABSOLUTE METAMYELOCYTES      0 cells/uL CANCELED    ABSOLUTE MYELOCYTES      0 cells/uL CANCELED    ABSOLUTE PROMYELOCYTES      0 cells/uL CANCELED    Lymphocytes Absolute      850 - 3,900 cells/uL 1,231 1.48   Monocytes Absolute      200 - 950 cells/uL 531 0.42   Eosinophils Absolute      15 - 500 cells/uL 79 0.04   Basophils Absolute      0 - 200 cells/uL 40 0.02   ABSOLUTE BLASTS      0 cells/uL CANCELED    ABSOLUTE NUCLEATED RBC      0 cells/uL CANCELED    Neutrophils %      % 43 63.3   BAND NEUTROPHILS      % CANCELED    Metamyelocytes      % CANCELED    MYELOCYTES      % CANCELED    PROMYELOCYTES      % CANCELED    Lymphocytes %      % 37.3 27.6   REACTIVE LYMPHOCYTES      0 - 10 % CANCELED Monocytes %      % 16.1 7.8   Eosinophils %      % 2.4 0.7   Basophils %      % 1.2 0.4   BLASTS      % CANCELED    NUCLEATED RBC      0 /100 WBC CANCELED    COMMENT(S)       CANCELED    RDW-SD      35.1 - 46.3 fL  41.8   Prelim Neutrophil Abs      1.50 - 7.70 x10 (3) uL  3.40   Immature Granulocyte Absolute      0.00 - 1.00 x10(3) uL  0.01   Immature Granulocyte %      %  0.2   Glucose      65 - 99 mg/dL 127 (H) 104 (H)   Sodium      135 - 146 mmol/L 137 141   Potassium      3.5 - 5.3 mmol/L 4.1 3.4 (L)   Chloride      98 - 110 mmol/L 108 109   Carbon Dioxide, Total      20 - 32 mmol/L 23 24.0   ANION GAP      0 - 18 mmol/L  8   BUN      7 - 25 mg/dL 11 8   CREATININE      0.60 - 1.26 mg/dL 0.60 0.61 (L)   BUN/CREATININE RATIO      6 - 22 (calc) NOT APPLICABLE 10.1   CALCIUM      8.6 - 10.3 mg/dL 9.1 8.4 (L)   CALCULATED OSMOLALITY      275 - 295 mOsm/kg  291   eGFR-Cr      >=60 mL/min/1.73m2  125   ALT (SGPT)      9 - 46 U/L 12 20   AST (SGOT)      10 - 40 U/L 15 14 (L)   ALKALINE PHOSPHATASE      45 - 117 U/L  65   Total Bilirubin      0.2 - 1.2 mg/dL 0.4 1.0   PROTEIN, TOTAL      6.1 - 8.1 g/dL 6.4 7.1   Albumin      3.6 - 5.1 g/dL 4.1 3.7   Globulin      1.9 - 3.7 g/dL (calc) 2.3 3.4   A/G Ratio      1.0 - 2.5 (calc) 1.8 1.1   Patient Fasting for CMP? Yes   EGFR      > OR = 60 mL/min/1.73m2 126    Alkaline Phosphatase      36 - 130 U/L 70    Cholesterol, Total      <200 mg/dL  129   HDL Cholesterol      40 - 59 mg/dL  41   Triglycerides      30 - 149 mg/dL  92   LDL Cholesterol Calc      <100 mg/dL  70   VLDL      0 - 30 mg/dL  14   NON-HDL CHOLESTEROL      <130 mg/dL  88   Patient Fasting for Lipid? Yes   Quantiferon TB NIL      IU/mL  0.04   Quantiferon-TB1 Minus NIL      IU/mL  0.01   Quantiferon-TB2 Minus NIL      IU/mL  0.01   Quantiferon TB Mitogen minus NIL      IU/mL  >10.00   QTB. RESULT      Negative  Negative   HBSAg Screen      Nonreactive   Nonreactive   Hbsag Screen Index <0.10   C-REACTIVE PROTEIN      <8.0 mg/L 8.3 (H) <0.29   SED RATE      0 - 15 mm/Hr  7   HCV AB      Nonreactive   Nonreactive   HEPATITIS B CORE AB, TOTAL      Nonreactive   Nonreactive   TSH      0.358 - 3.740 mIU/mL  1.020   Vitamin B12      193 - 986 pg/mL  567   HEMOGLOBIN A1c      <5.7 % of total Hgb 5.3    SED RATE BY MODIFIED$GENOREN      < OR = 15 mm/h 6      Labs from dr. Gianfranco Krause,     10/7/2019 -   ua is negative,   9/11/2019 -   cmp - cr is 0.8, ast is 14, altis 10, hba1c is 5.2, rf is 33, wbc is 4.8, hb is 13.9, plt are 204, sed rate is 2mm/hr. tsh is 2.88  ASSESSMENT AND PLAN:   Bertin Honeycutt is a 42year old male who presents for No chief complaint on file. 1. Seropositive rheumatoid arthritis - positive RF and CCP   Mod activity -   Flare up likely from infection   Started in 2018 - has been on methotrexate 12.5mg aw [place] - recently this has not been enough   -  Was up to .methotrexate 17.5mg ad ay and fa 1mg a day - but stopped in 2/2021 -   Off meds for 8 months , now back on since 12/2021 -   - currently wbc is low again   -  Medrol christina for flare up   - cont. humira 40mg every 2 weeks - started 3.8/2023     He went off the methtorexate - 2 months after starting humira. - so likely this may be why he had a flare as well. - check labs in 2-3 months -   - rtc in 2-3 months. - ok to take celebrex once a day   - repeat wbc in 3-4 weeks if ok then restart methtorexate 10mg a week x 2 weeks, then 6 tablets  aweek     - asked him to stop drinking etoh b/c of methotrexate - he ended up stopping methtorexate      2. Prediabetes becoming diabetes - - f/u with dr. Linda Méndez, decrease sugar but still has elevated sugar --hba1c  5.3 - fasting glucose - is always slightly elevated     3. He's opting out of covid vaccine -     4. quantiferon gold 10/2022 - due again in 10/2023 -     Summary:  1. Cont. humira 40mg every 2 weeks. For now   2.  Likely infection flared you - ok to take prednisone - Take 6 tabsx 1 day, take 5 tabsx 1 day, take 4 tabsx 1 day,take 3 tabsx 1 day, take 2 tabsx 1 day, take 1 tabsx 1 day, then off  3. Ok to cont. Celebrex 200mg a day -   4. Check labs in 3 weeks. - esr, crp and cbc   5. Return to [facility] in 3-6 months.   - if pain is still not better in 1-4 weeks, then can try to increase humira dosing. 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 - ok to get labs at [facility] - please just let me know and me ro dr. Dania Brasher can change them         Sabrina Mackay MD  7/14/2023   11:47 AM  - Reviewed [place]- information  through Epic         Please note that the following visit was completed using two-way, real-time interactive audio and/or video communication. This was done with patient consent. This has been done in good neda to provide continuity of care in the best interest of the provider-patient relationship, due to the ongoing public health crisis/national emergency and because of restrictions of visitation. There are limitations of this visit as no physical exam could be performed. Every conscious effort was taken to allow for sufficient and adequate time. This billing was spent on reviewing labs, medications, radiology tests and decision making.   Appropriate medical decision-making and tests are ordered as detailed in the plan of care above

## 2023-09-15 RX ORDER — ADALIMUMAB 40MG/0.4ML
40 KIT SUBCUTANEOUS
Qty: 2 EACH | Refills: 5 | Status: SHIPPED | OUTPATIENT
Start: 2023-09-15

## 2023-11-06 RX ORDER — PREDNISONE 5 MG/1
TABLET ORAL
Qty: 21 TABLET | Refills: 0 | Status: SHIPPED | OUTPATIENT
Start: 2023-11-06

## 2023-11-06 NOTE — TELEPHONE ENCOUNTER
Patient calling to inform that pharmacy has not received refill for:  predniSONE 5 MG Oral Tab   Patient is out of medication

## 2023-11-07 RX ORDER — PREDNISONE 5 MG/1
TABLET ORAL
Qty: 21 TABLET | Refills: 0 | OUTPATIENT
Start: 2023-11-07

## 2023-11-20 ENCOUNTER — TELEPHONE (OUTPATIENT)
Dept: RHEUMATOLOGY | Facility: CLINIC | Age: 38
End: 2023-11-20

## 2023-11-20 ENCOUNTER — OFFICE VISIT (OUTPATIENT)
Dept: RHEUMATOLOGY | Facility: CLINIC | Age: 38
End: 2023-11-20

## 2023-11-20 VITALS
SYSTOLIC BLOOD PRESSURE: 95 MMHG | HEART RATE: 71 BPM | RESPIRATION RATE: 16 BRPM | WEIGHT: 143.5 LBS | HEIGHT: 64 IN | DIASTOLIC BLOOD PRESSURE: 63 MMHG | BODY MASS INDEX: 24.5 KG/M2

## 2023-11-20 DIAGNOSIS — Z51.81 ENCOUNTER FOR THERAPEUTIC DRUG MONITORING: ICD-10-CM

## 2023-11-20 DIAGNOSIS — M06.9 RHEUMATOID ARTHRITIS, INVOLVING UNSPECIFIED SITE, UNSPECIFIED WHETHER RHEUMATOID FACTOR PRESENT (HCC): Primary | ICD-10-CM

## 2023-11-20 PROCEDURE — 3074F SYST BP LT 130 MM HG: CPT | Performed by: INTERNAL MEDICINE

## 2023-11-20 PROCEDURE — 3078F DIAST BP <80 MM HG: CPT | Performed by: INTERNAL MEDICINE

## 2023-11-20 PROCEDURE — 99214 OFFICE O/P EST MOD 30 MIN: CPT | Performed by: INTERNAL MEDICINE

## 2023-11-20 PROCEDURE — 3008F BODY MASS INDEX DOCD: CPT | Performed by: INTERNAL MEDICINE

## 2023-11-20 RX ORDER — PREDNISONE 10 MG/1
TABLET ORAL
Qty: 42 TABLET | Refills: 0 | Status: SHIPPED | OUTPATIENT
Start: 2023-11-20

## 2023-11-20 NOTE — PATIENT INSTRUCTIONS
1. Stop humira   2. Start rinvoq 15mg a day -   3. Ok to cont. Celebrex 200mg a day - twice a day -   4. Return to clin in 2-3 months. 1/22 at 4:30 pm   5.  Start pred 10mg a day - Take 6 tabs x 2 day, take 5 tabs x 2 day, take 4 tabs x 2 day, take 3 tabs x 2 day, take 2 tabs x 2 day, take 1 tab x 2 day, then of

## 2023-11-21 NOTE — TELEPHONE ENCOUNTER
PA start    Prior authorization for: rinvoq    Medication form: 15mg    Submission method: surescripts    Spoke with (if by phone):     Date submitted: 11/21/23    Tracking #:    QF-TB result: Negative 10/13/22    Reordered yesterday.

## 2023-11-21 NOTE — TELEPHONE ENCOUNTER
He can try contacting [employer] of [facility] support, but since the charge has already occurred, I am not sure there is anything they can due to help. Left message for patient to call back. Will work on starting [id number] for [facility]. Started on surescripts and will need complete notes. Await forms.

## 2023-11-21 NOTE — TELEPHONE ENCOUNTER
PA Approved per jennifer    Prior authorization for: Rinvoq    Medication form: 15mg tab    Date received: 11/21/2023    Approval #: [id number]    Approved dates: 11/21/2023 - 11/21/2024    Pharmacy for medication: [facility]    QF-TB results:  Needs TB redrawn. Message sent to patient.

## 2023-12-02 RX ORDER — UPADACITINIB 15 MG/1
15 TABLET, EXTENDED RELEASE ORAL DAILY
Qty: 30 TABLET | Refills: 5 | Status: SHIPPED | OUTPATIENT
Start: 2023-12-02 | End: 2024-01-01

## 2023-12-02 NOTE — TELEPHONE ENCOUNTER
He can start rinvoq - will send in but he needs to have the tb test drawn next week   Will send in rinvoq

## 2024-01-22 ENCOUNTER — OFFICE VISIT (OUTPATIENT)
Dept: RHEUMATOLOGY | Facility: CLINIC | Age: 39
End: 2024-01-22
Payer: COMMERCIAL

## 2024-01-22 VITALS
BODY MASS INDEX: 24.98 KG/M2 | HEIGHT: 64 IN | RESPIRATION RATE: 16 BRPM | WEIGHT: 146.31 LBS | HEART RATE: 67 BPM | SYSTOLIC BLOOD PRESSURE: 98 MMHG | DIASTOLIC BLOOD PRESSURE: 63 MMHG

## 2024-01-22 DIAGNOSIS — Z51.81 ENCOUNTER FOR THERAPEUTIC DRUG MONITORING: ICD-10-CM

## 2024-01-22 DIAGNOSIS — M06.9 RHEUMATOID ARTHRITIS, INVOLVING UNSPECIFIED SITE, UNSPECIFIED WHETHER RHEUMATOID FACTOR PRESENT (HCC): Primary | ICD-10-CM

## 2024-01-22 PROCEDURE — 3008F BODY MASS INDEX DOCD: CPT | Performed by: INTERNAL MEDICINE

## 2024-01-22 PROCEDURE — 3074F SYST BP LT 130 MM HG: CPT | Performed by: INTERNAL MEDICINE

## 2024-01-22 PROCEDURE — 99214 OFFICE O/P EST MOD 30 MIN: CPT | Performed by: INTERNAL MEDICINE

## 2024-01-22 PROCEDURE — 3078F DIAST BP <80 MM HG: CPT | Performed by: INTERNAL MEDICINE

## 2024-01-22 RX ORDER — UPADACITINIB 15 MG/1
TABLET, EXTENDED RELEASE ORAL
COMMUNITY
Start: 2024-01-08

## 2024-01-22 NOTE — PROGRESS NOTES
America Kapoor is a 38 year old male who presents for   Chief Complaint   Patient presents with    Rheumatoid Arthritis    Medication Follow-Up   .   HPI:     I had the pleasure of seeing America Kapoor on 5/22/2020 for evaluation.     He is a pleasant 34 year old who has been having ongoing polyarthralgia.he has positive RF and CCP. He was referred by Dr. Jimenez.  It started a couple of years ago. It was mostly hands and swollen fingers with limited mobility .it was elbows ,knees and ankels .a little bit in his shoulders.   He was seeing Dr. Luque , pcp a couple of years ago.  He was put on prednisone and this alleviated his pain. He was alsos started on methorexate 12.5mg a week..  He was sent to a rheumatologist, Dr. Mendoza. He wasn't sure if it was rheumatoid arthritis. He hasn't cont. Prednisone.     In Decemeber 2019, he started getting the pain again really bad. He was supposed to see a rheumatolgoist around 2/2020. His dad passed away then and he missed that appointment. Recently he reached out to dr. Jimenez.   He got a medrol christina. He finshed medrol christina last week.   He's taking 2 ibuporfen. He has about 6/10 pain in the morning. He has 3-4/10 pain now.   His shoulders duane left shoulder 7-8/10 and hands are hurting today.  His left 2nd and 3rd pips are swollen and tender.     Dr. Snyder did the original labs for that. He was told that the rheumatoid factor was positive.    He is tired latealy.   Never had side effects with mtx  for fatigue. He feels the methotrexate was  Controlling his pain up until in 12/2019.   He's taking ibuprofen 200mg - 2 daily prn - it's not daily but often.       When he gets a flare - he gets a low grade fever 99. Last night he was 99.6. he's monitoring it daily. It's fine in the morning. He's had a flare in 12/2019 and recently this year he's had two.     6/24/2020  Pain wise and he is better on mtx 7 tablets a week. He has still felt discomfort and he feels tired.   In the  beginning, the increase caused indigestion.   He has felt like he is dragging.   He has 1/10 pain.     He has morning stiffness for a couple of hours. He has only taken ibuprofen 1-2 times ad ay.     1/11/2021  He's feeling ok. But the last 2-3 weeks he has a bad flare up.   Mostly it was more pain and swelling in his hands. He took ibuporfen and that helped. He wasn't incapacitated.   He's been overall pretty stable on methotrexate  7 tablts aw [place]. He felt limited movement in his hands.   He has 4/10 pain.   Sometimes e gets heart burn.   He's taking celebrex 200mg daily. hes' skipping this when hes' taking ibuporfen. 400mg once a day -     10/11/2021  He ended up stopping the methotrexate and stopped for a couple of weeks. He wasn't feeling bad for a while - so he continued to stop at end of 2/2021.  He was a little nauseated on methotrexate and fatigued.     Then he started getting the pain back in the last 1 1/2 -2 months.   He has about 7/10 pain. Today . It gets better as the day goes one.     He's been taking advil and then switched to celebrex twice a day.     His sugasr is 119 - he was on a candy binge the last 1 month. -     12/20/2021  He is back on methotrexate and he was put back on methtorexate 7 tablets a week x 2 weeks.   He did take medrol christina on 11/24/2201 -   And he is in pain for the last 4 days hurting. Gavin in his jodi. He's taking celebrex daily    D/w him about biologis -   He is  and  - he doesn't want to get the vaccine.   D/w him about risk and benefits of treatment       2/7/2023  VIDEO VISIT  He is still on methotrexate. It was doing it's job. He always gets one day post methotrxate - of dizziness and fatigue   But recently he is having more pain - like  last week and it's coming and going more often.   He has a lot of hand pain . His shoulders , knees and his ankles at times.   He was hesitant to get the covid vaccine and steered away the from the [facility].      Today he has no pain but the past couple of weeks it was unbearable. He was taking advil and that helping.     He has eliminated alcohol altogether -     The past 2-3 weeks is overwhelming   No recent infections - but his wbc is lower -   He is feeling the methotreate is not working like it did and is interested in trying biolgoics now -     7/14/2023   VIDEO VISIT  He feels the humira was working for him well.   Last week he had noticed more discomfrot - it was unbearable .   2 nights ago he could barely walk.   He started going to the gym and thought that was what was wrong.   About 3 weeks it's tarted progressing worse.   Over the weekend he felt feverish. He thought it was exercise related and Monday morning - he had bad dysuria.   Told he had a bladder and kidney infection. He took his last abs today.   He took a zpak.   His joitn pain is better- he took advil with celerex wednessday  He's still a little swollen still.     And takig more celebrex.   He has 4/10 pain - it's not back in his hands. He can manage.   His ankles and knees, and back fo his neck was really bad  The peak of the pain was 2 days ago.       11/20/2023  Feels a bad flare for the last 1 month.   He took a pred bpack on 11/6 - but can't slepe and get out of bed and it's the worse in the roning and at night.   He goes to bed tandt akes celebrex.     He feels humira is not working for him.   9-12 pm it's worse.   He done'st feel like it's working - after taking it he doesn's have any effect.   flaring    1/22/2024   He's doing really well - he hasn't had to take celebrex.   Taking rinvoq 15mg a dya - good to go. - no pains.   No stomach issues.   He feels this is working much better than humira.   No infections.   He is fine at work.       Wt Readings from Last 2 Encounters:   01/22/24 146 lb 4.8 oz (66.4 kg)   11/20/23 143 lb 8 oz (65.1 kg)     Body mass index is 25.11 kg/m².      Current Outpatient Medications   Medication Sig Dispense  Refill    RINVOQ 15 MG Oral Tablet 24 Hr       celecoxib (CELEBREX) 200 MG Oral Cap Take 1 capsule (200 mg total) by mouth 2 (two) times daily. 60 capsule 1      History reviewed. No pertinent past medical history.   History reviewed. No pertinent surgical history.   Family History   Problem Relation Age of Onset    Cancer Father     Gastro-Intestinal Disorder Father    no fh of rheumatoid  2 older brothers and 1 sister    Social History:  Social History     Socioeconomic History    Marital status:    Tobacco Use    Smoking status: Every Day     Packs/day: 0.50     Years: 15.00     Additional pack years: 0.00     Total pack years: 7.50     Types: Cigarettes    Smokeless tobacco: Never   Vaping Use    Vaping Use: Never used   Substance and Sexual Activity    Alcohol use: Yes     Alcohol/week: 4.0 standard drinks of alcohol     Types: 4 Standard drinks or equivalent per week   Other Topics Concern    Caffeine Concern No    Exercise No    Seat Belt No    Special Diet No    Stress Concern No    Weight Concern No      ,   Manager at [employer] -      REVIEW OF SYSTEMS:   Review Of Systems:  Fatigue  Constitutional:No fever, no change in weight or appetitie  Derm: No rashes, no oral ulcers, no alopecia, no photosensitivity, no psoriasis  HEENT: No dry eyes, no dry mouth, no Raynaud's, no nasal ulcers, no parotid swelling, no neck pain, no jaw pain, no temple pain  Eyes: No visual changes,   CVS: No chest pain, no heart disease  RS: No SOB, no Cough, occl Pleurtic pain in chest - not really bad,   GI: No nausea, no vomiiting, no abominal pain, no hx of ulcer, getting heartburn, no dyshpagia, no BRBPR or melena  : no  Dysuria - 8 months ago had blood in urine - it's better now. , hx of kidney stones   Neuro: tingilng in his feet,  numbness or tingling, no headache, no hx of seizures,   Psych: no hx of anxiety or depression  ENDO: no hx of thyroid disease, no hx of DM  Joint/Muscluskeltal: see HPI,    All other ROS are negative.     EXAM:   Resp 16   Ht 5' 4\" (1.626 m)   Wt 146 lb 4.8 oz (66.4 kg)   BMI 25.11 kg/m²   HEENT: EOM intact, clear sclear, PERRLA, pleasant, no acute distress,  no neck tendnerness, good ROM,   No rashes  CVS:RRR  RS: CTAB  ABD: Soft abd,   Joint exam:   he has mild  swellign in left 2nd and 3rd pip.  Right 2nd and 3rd pips tender.   B/l shoulders tender,   B/l shoulders  can raise them but with difficulty   B/l knees tender.   B//l ankles tender  Squeeze test tender. Positive.   Has mild swelling in right 4th pips -      No no edema    Component      Latest Ref Rn 10/12/2023   WBC      3.8 - 10.8 Thousand/uL 4.7    RBC      4.20 - 5.80 Million/uL 4.83    Hemoglobin      13.2 - 17.1 g/dL 13.7    Hematocrit      38.5 - 50.0 % 40.8    MCV      80.0 - 100.0 fL 84.5    MCH      27.0 - 33.0 pg 28.4    MCHC      32.0 - 36.0 g/dL 33.6    RDW      11.0 - 15.0 % 13.5    Platelet Count      140 - 400 Thousand/uL 173    MPV      7.5 - 12.5 fL 11.1    Neutrophils Absolute      1,500 - 7,800 cells/uL 2,820    ABSOLUTE BAND NEUTROPHILS      0 - 750 cells/uL CANCELED    ABSOLUTE METAMYELOCYTES      0 cells/uL CANCELED    ABSOLUTE MYELOCYTES      0 cells/uL CANCELED    ABSOLUTE PROMYELOCYTES      0 cells/uL CANCELED    Lymphocytes Absolute      850 - 3,900 cells/uL 1,175    Monocytes Absolute      200 - 950 cells/uL 606    Eosinophils Absolute      15 - 500 cells/uL 71    Basophils Absolute      0 - 200 cells/uL 28    ABSOLUTE BLASTS      0 cells/uL CANCELED    ABSOLUTE NUCLEATED RBC      0 cells/uL CANCELED    Neutrophils %      % 60    BAND NEUTROPHILS      % CANCELED    Metamyelocytes      % CANCELED    MYELOCYTES      % CANCELED    PROMYELOCYTES      % CANCELED    Lymphocytes %      % 25.0    REACTIVE LYMPHOCYTES      0 - 10 % CANCELED    Monocytes %      % 12.9    Eosinophils %      % 1.5    Basophils %      % 0.6    BLASTS      % CANCELED    NUCLEATED RBC      0 /100 WBC CANCELED     COMMENT(S) CANCELED    Glucose      65 - 99 mg/dL 112 (H)    BUN      7 - 25 mg/dL 18    CREATININE      0.60 - 1.29 mg/dL 0.73    EGFR      > OR = 60 mL/min/1.73m2 118    BUN/CREATININE RATIO      6 - 22 (calc) SEE NOTE:    Sodium      135 - 146 mmol/L 136    Potassium      3.5 - 5.3 mmol/L 4.0    Chloride      98 - 110 mmol/L 104    Carbon Dioxide, Total      20 - 32 mmol/L 24    CALCIUM      8.6 - 10.3 mg/dL 8.7    PROTEIN, TOTAL      6.1 - 8.1 g/dL 7.0    Albumin      3.6 - 5.1 g/dL 4.1    Globulin      1.9 - 3.7 g/dL (calc) 2.9    A/G Ratio      1.0 - 2.5 (calc) 1.4    Total Bilirubin      0.2 - 1.2 mg/dL 0.7    Alkaline Phosphatase      36 - 130 U/L 60    AST (SGOT)      10 - 40 U/L 16    ALT (SGPT)      9 - 46 U/L 24    Cholesterol, Total      <200 mg/dL 158    HDL Cholesterol      > OR = 40 mg/dL 39 (L)    Triglycerides      <150 mg/dL 92    LDL Cholesterol Calc      mg/dL (calc) 100 (H)    Chol/HDL Ratio      <5.0 (calc) 4.1    NON-HDL CHOLESTEROL      <130 mg/dL (calc) 119    TSH      0.40 - 4.50 mIU/L 3.45    C-REACTIVE PROTEIN      <8.0 mg/L 19.2 (H)    SED RATE BY MODIFIED$WESTERGREN      < OR = 15 mm/h 9    PSA, TOTAL      < OR = 4.00 ng/mL 0.60    ALPHA FETOPROTEIN,$TUMOR MARKER      <6.1 ng/mL 1.2    CA 19-9      <34 U/mL 11       Legend:  (H) High  (L) Low    Labs from dr. Quinones,     10/7/2019 -   ua is negative,   9/11/2019 -   cmp - cr is 0.8, ast is 14, altis 10, hba1c is 5.2, rf is 33, wbc is 4.8, hb is 13.9, plt are 204, sed rate is 2mm/hr.   tsh is 2.88  ASSESSMENT AND PLAN:   America Kapoor is a 38 year old male who presents for   Chief Complaint   Patient presents with    Rheumatoid Arthritis    Medication Follow-Up       1. Seropositive rheumatoid arthritis - positive RF and CCP   Mod to severe activity -   Very stable,   Cont.  rinvoq 15mg a day , started in 11/2024   S/p pred burst  Check quantiferon gold   - ok to take celebrex once a day prn   Rtc in 4-6 months.     In the past:    Started in 2018 - has been on methotrexate 12.5mg aw [place] - recently this has not been enough   -  Was up to .methotrexate 17.5mg ad ay and fa 1mg a day - but stopped in 2/2021 -   Off meds for 8 months , now back on since 12/2021 -   - currently wbc is low again   Stopped methtorexate in 5/2023 - again after starting uurmai   He went off the methtorexate - 2 months after starting humira. -   - stop humira 40mg every 2 weeks - started 3/8/2023  - - 6/2023 - stopped in 11/2023 -  Flaring up the last 1 month on humira , it stopped working.       2. Prediabetes becoming diabetes - - f/u with dr. Jimenez, decrease sugar but still has elevated sugar --hba1c  5.3 - fasting glucose - is always slightly elevated     3. He's opting out of covid vaccine -     4. quantiferon gold 10/2022 - due again in 10/2023 -     Summary:  1.  Cont. rinvoq 15mg a day -   2. cont. Celebrex 200mg a day - as needed.   3. 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         Murtaza Diaz MD  1/22/2024   4:39 PM

## 2024-01-22 NOTE — PATIENT INSTRUCTIONS
1.  Cont. rinvoq 15mg a day -   2. cont. Celebrex 200mg a day - twice a day -   3. Return to [facility] in 4=6 months.   4  check labs in 2 months - - including quantiferon

## 2024-06-03 DIAGNOSIS — M06.9 RHEUMATOID ARTHRITIS, INVOLVING UNSPECIFIED SITE, UNSPECIFIED WHETHER RHEUMATOID FACTOR PRESENT (HCC): Primary | ICD-10-CM

## 2024-06-03 DIAGNOSIS — Z51.81 ENCOUNTER FOR THERAPEUTIC DRUG MONITORING: ICD-10-CM

## 2024-06-03 NOTE — TELEPHONE ENCOUNTER
Chief Complaint Patient presents with  
 Hip Pain Left 0/10 pain. Requested Prescriptions     Pending Prescriptions Disp Refills    RINVOQ 15 MG Oral Tablet 24 Hr [Pharmacy Med Name: RINVOQ ER 15 MG TABLET] 30 tablet 0     Sig: TAKE 1 TABLET DAILY     LF: 12/2/23  LOV: 11/22/24  Future Appointments   Date Time Provider Department Center   7/22/2024  2:30 PM Murtaza Diaz MD [facility]     Labs:     Component      Latest Ref Rn 10/12/2023   WBC      3.8 - 10.8 Thousand/uL 4.7    RBC      4.20 - 5.80 Million/uL 4.83    Hemoglobin      13.2 - 17.1 g/dL 13.7    Hematocrit      38.5 - 50.0 % 40.8    MCV      80.0 - 100.0 fL 84.5    MCH      27.0 - 33.0 pg 28.4    MCHC      32.0 - 36.0 g/dL 33.6    RDW      11.0 - 15.0 % 13.5    Platelet Count      140 - 400 Thousand/uL 173    MPV      7.5 - 12.5 fL 11.1    Neutrophils Absolute      1,500 - 7,800 cells/uL 2,820    ABSOLUTE BAND NEUTROPHILS      0 - 750 cells/uL CANCELED    ABSOLUTE METAMYELOCYTES      0 cells/uL CANCELED    ABSOLUTE MYELOCYTES      0 cells/uL CANCELED    ABSOLUTE PROMYELOCYTES      0 cells/uL CANCELED    Lymphocytes Absolute      850 - 3,900 cells/uL 1,175    Monocytes Absolute      200 - 950 cells/uL 606    Eosinophils Absolute      15 - 500 cells/uL 71    Basophils Absolute      0 - 200 cells/uL 28    ABSOLUTE BLASTS      0 cells/uL CANCELED    ABSOLUTE NUCLEATED RBC      0 cells/uL CANCELED    Neutrophils %      % 60    BAND NEUTROPHILS      % CANCELED    Metamyelocytes      % CANCELED    MYELOCYTES      % CANCELED    PROMYELOCYTES      % CANCELED    Lymphocytes %      % 25.0    REACTIVE LYMPHOCYTES      0 - 10 % CANCELED    Monocytes %      % 12.9    Eosinophils %      % 1.5    Basophils %      % 0.6    BLASTS      % CANCELED    NUCLEATED RBC      0 /100 WBC CANCELED    COMMENT(S) CANCELED    Glucose      65 - 99 mg/dL 112 (H)    BUN      7 - 25 mg/dL 18    CREATININE      0.60 - 1.29 mg/dL 0.73    EGFR      > OR = 60 mL/min/1.73m2 118    BUN/CREATININE RATIO      6 - 22 (calc) SEE NOTE:    Sodium       135 - 146 mmol/L 136    Potassium      3.5 - 5.3 mmol/L 4.0    Chloride      98 - 110 mmol/L 104    Carbon Dioxide, Total      20 - 32 mmol/L 24    CALCIUM      8.6 - 10.3 mg/dL 8.7    PROTEIN, TOTAL      6.1 - 8.1 g/dL 7.0    Albumin      3.6 - 5.1 g/dL 4.1    Globulin      1.9 - 3.7 g/dL (calc) 2.9    A/G Ratio      1.0 - 2.5 (calc) 1.4    Total Bilirubin      0.2 - 1.2 mg/dL 0.7    Alkaline Phosphatase      36 - 130 U/L 60    AST (SGOT)      10 - 40 U/L 16    ALT (SGPT)      9 - 46 U/L 24    C-REACTIVE PROTEIN      <8.0 mg/L 19.2 (H)    SED RATE BY MODIFIED$WESTERGREN      < OR = 15 mm/h 9       Legend:  (H) High    Summary:  1.  Cont. rinvoq 15mg a day -   2. cont. Celebrex 200mg a day - as needed.   3. 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            Murtaza Diaz MD  1/22/2024   4:39 PM

## 2024-06-04 RX ORDER — UPADACITINIB 15 MG/1
1 TABLET, EXTENDED RELEASE ORAL DAILY
Qty: 30 TABLET | Refills: 5 | Status: SHIPPED | OUTPATIENT
Start: 2024-06-04

## 2024-09-18 ENCOUNTER — TELEPHONE (OUTPATIENT)
Dept: RHEUMATOLOGY | Facility: CLINIC | Age: 39
End: 2024-09-18

## 2024-09-18 NOTE — TELEPHONE ENCOUNTER
[facility] that they are unable to fill his Rinvoq at this time because there is not active insurance on file at this time.     Attempted to contact patient. Left message to call back. \

## 2024-09-23 NOTE — TELEPHONE ENCOUNTER
Spoke with patient and he has [employer] insurance. According to the patient, his insurance informed him he will need a PA for this medication to be covered.  Patient was advised by [employer] insurance they will be calling us regarding getting a PA for this medication.

## 2024-09-27 ENCOUNTER — TELEPHONE (OUTPATIENT)
Dept: RHEUMATOLOGY | Facility: CLINIC | Age: 39
End: 2024-09-27

## 2024-09-27 NOTE — TELEPHONE ENCOUNTER
PA start    Prior authorization for: Rinvoq    Medication form: 15 mg tablet     Submission method: fax to [employer]    Spoke with (if by phone):     Date submitted: 9/27/24    Tracking #:    [id number] result: ordered but not completed

## 2024-10-03 NOTE — TELEPHONE ENCOUNTER
PA Denied    Prior authorization for: Rinvoq    Tracking #: [id number] # [id number]    Denial reason: Not formulary. This plan will not cover any non-formulary medications. Formulary alternatives are Enbrel and Xeljanz.      Please advise how you would like to proceed.

## 2024-10-10 NOTE — TELEPHONE ENCOUNTER
Received notice from [facility] that patient does not have active insurance coverage with [employer]. This is unlikely has he was denied another medication last week. Attempted to call [facility]. Left voice mail for pharmacy benefits team.

## 2024-10-14 NOTE — TELEPHONE ENCOUNTER
Never heard back and was unable to reach rep via phone. Sent in new request via [employer] PA portal.

## 2024-10-16 NOTE — TELEPHONE ENCOUNTER
Swetha from [employer] is calling regarding the prior authorization for patient's    Xeljanz    According to the records, the patient no longer has insurance coverage with [employer], it ended 9/30/2024.   The Prior Authorization has been cancelled.     Please advise.

## 2024-10-17 NOTE — TELEPHONE ENCOUNTER
Attempted to contact patient to determine if he has any active insurance. None on file.     He is has none, he may qualify for patient assistance.     Left message to call back.

## 2024-11-01 ENCOUNTER — TELEPHONE (OUTPATIENT)
Dept: RHEUMATOLOGY | Facility: CLINIC | Age: 39
End: 2024-11-01

## 2024-11-01 DIAGNOSIS — M06.9 RHEUMATOID ARTHRITIS, INVOLVING UNSPECIFIED SITE, UNSPECIFIED WHETHER RHEUMATOID FACTOR PRESENT (HCC): ICD-10-CM

## 2024-11-01 NOTE — TELEPHONE ENCOUNTER
Patient is calling to advise provider he does not have insurance and has been unable to obtain insurance with the State.     Per [employer] message on 10/3/24 patient is calling to request assistance for the medication Rinvoq and mentions he only has about 4 days dosage remaining.

## 2024-11-02 NOTE — TELEPHONE ENCOUNTER
Please advise.    LOV: 1/22/24  No future appointments.  Labs:     Component      Latest Ref Rng 10/12/2023   WBC      3.8 - 10.8 Thousand/uL 4.7    RBC      4.20 - 5.80 Million/uL 4.83    Hemoglobin      13.2 - 17.1 g/dL 13.7    Hematocrit      38.5 - 50.0 % 40.8    MCV      80.0 - 100.0 fL 84.5    MCH      27.0 - 33.0 pg 28.4    MCHC      32.0 - 36.0 g/dL 33.6    RDW      11.0 - 15.0 % 13.5    Platelet Count      140 - 400 Thousand/uL 173    MPV      7.5 - 12.5 fL 11.1    Neutrophils Absolute      1,500 - 7,800 cells/uL 2,820    ABSOLUTE BAND NEUTROPHILS      0 - 750 cells/uL CANCELED    ABSOLUTE METAMYELOCYTES      0 cells/uL CANCELED    ABSOLUTE MYELOCYTES      0 cells/uL CANCELED    ABSOLUTE PROMYELOCYTES      0 cells/uL CANCELED    Lymphocytes Absolute      850 - 3,900 cells/uL 1,175    Monocytes Absolute      200 - 950 cells/uL 606    Eosinophils Absolute      15 - 500 cells/uL 71    Basophils Absolute      0 - 200 cells/uL 28    ABSOLUTE BLASTS      0 cells/uL CANCELED    ABSOLUTE NUCLEATED RBC      0 cells/uL CANCELED    Neutrophils %      % 60    BAND NEUTROPHILS      % CANCELED    Metamyelocytes      % CANCELED    MYELOCYTES      % CANCELED    PROMYELOCYTES      % CANCELED    Lymphocytes %      % 25.0    REACTIVE LYMPHOCYTES      0 - 10 % CANCELED    Monocytes %      % 12.9    Eosinophils %      % 1.5    Basophils %      % 0.6    BLASTS      % CANCELED    NUCLEATED RBC      0 /100 WBC CANCELED    COMMENT(S) CANCELED    Glucose      65 - 99 mg/dL 112 (H)    BUN      7 - 25 mg/dL 18    CREATININE      0.60 - 1.29 mg/dL 0.73    EGFR      > OR = 60 mL/min/1.73m2 118    BUN/CREATININE RATIO      6 - 22 (calc) SEE NOTE:    Sodium      135 - 146 mmol/L 136    Potassium      3.5 - 5.3 mmol/L 4.0    Chloride      98 - 110 mmol/L 104    Carbon Dioxide, Total      20 - 32 mmol/L 24    CALCIUM      8.6 - 10.3 mg/dL 8.7    PROTEIN, TOTAL      6.1 - 8.1 g/dL 7.0    Albumin      3.6 - 5.1 g/dL 4.1    Globulin      1.9 -  3.7 g/dL (calc) 2.9    A/G Ratio      1.0 - 2.5 (calc) 1.4    Total Bilirubin      0.2 - 1.2 mg/dL 0.7    Alkaline Phosphatase      36 - 130 U/L 60    AST (SGOT)      10 - 40 U/L 16    ALT (SGPT)      9 - 46 U/L 24    C-REACTIVE PROTEIN      <8.0 mg/L 19.2 (H)    SED RATE BY MODIFIED$WESTERGREN      < OR = 15 mm/h 9       Legend:  (H) High    Summary:  1.  Cont. rinvoq 15mg a day -   2. cont. Celebrex 200mg a day - as needed.   3. 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            Murtaza Diaz MD  1/22/2024   4:39 PM

## 2024-11-05 ENCOUNTER — PATIENT MESSAGE (OUTPATIENT)
Dept: RHEUMATOLOGY | Facility: CLINIC | Age: 39
End: 2024-11-05

## 2024-11-06 NOTE — TELEPHONE ENCOUNTER
Yes, based on the information provided below he would qualify for the [employer] patient assistance program to continue Rinvoq.      Sent information in [employer] message to patient.     I called him as well. He did complete last night online application and has sent us his application number.     Provider portion complete and placed on providers desk for review and sign.

## 2025-03-20 ENCOUNTER — TELEPHONE (OUTPATIENT)
Dept: RHEUMATOLOGY | Facility: CLINIC | Age: 40
End: 2025-03-20

## 2025-03-20 DIAGNOSIS — Z51.81 ENCOUNTER FOR THERAPEUTIC DRUG MONITORING: ICD-10-CM

## 2025-03-20 DIAGNOSIS — M06.9 RHEUMATOID ARTHRITIS, INVOLVING UNSPECIFIED SITE, UNSPECIFIED WHETHER RHEUMATOID FACTOR PRESENT (HCC): Primary | ICD-10-CM

## 2025-03-20 NOTE — TELEPHONE ENCOUNTER
Patient would like a refill on Rx Rinvoq 15MG for 30 tablets. Please advise     Verified pharmacy  [facility]     Current Outpatient Medications   Medication Sig Dispense Refill    Upadacitinib ER (RINVOQ) 15 MG Oral Tablet 24 Hr TAKE 1 TABLET DAILY 30 tablet 5

## 2025-03-20 NOTE — TELEPHONE ENCOUNTER
Please advise on refill request    Future Appointments  Future Appointments (Rheumatology)       No appointments to display            Recent Appointments  Recent Outpatient Visits              01/22/2024 Rheumatoid arthritis, involving unspecified site, unspecified whether rheumatoid factor present ([facility])    [facility], Murtaza Richards MD    Office Visit    11/20/2023 Rheumatoid arthritis, involving unspecified site, unspecified whether rheumatoid factor present ([facility])    [facility], Murtaza Richards MD    Office Visit    07/14/2023 Rheumatoid arthritis, involving unspecified site, unspecified whether rheumatoid factor present ([facility])    [facility], uMrtaza Richards MD    Telemedicine    03/08/2023 Rheumatoid arthritis, involving unspecified site, unspecified whether rheumatoid factor present ([facility])    [facility], [facility] [place]    Nurse Only    02/07/2023 Rheumatoid arthritis, involving unspecified site, unspecified whether rheumatoid factor present ([facility])    [facility], Murtaza Richards MD    Telemedicine            Recent Labs (6 months)  No results found for this or any previous visit (from the past 4380 hours).  No results found for: \"AST\"  No results found for: \"ALT\"  No results found for: \"ESRML\"  No results found for: \"ALB\"  No results found for: \"CREATSERUM\"  No results found for: \"ESRML\"  No results found for: \"CRP\"  No results found for: \"URIC\"  No results found for: \"QFGOLDRESULT\"

## 2025-03-21 NOTE — TELEPHONE ENCOUNTER
Left message for pt - to let us know the pharmacy that he prefers - is it [facility] or [facility]? Not sure which one he is using.     Please mail pt. The lab orders -

## (undated) NOTE — LETTER
08/11/20        America Kapoor  [address] [facility]      Dear Margarette Witt,    Our records indicate that you have outstanding lab work and or testing that was ordered for you and has not yet been completed:  Orders Placed This [place]

## (undated) NOTE — LETTER
03/24/21        America Kapoor  [address] [facility]      Dear Ondina Bernal,    Our records indicate that you have outstanding lab work and or testing that was ordered for you and has not yet been completed:  Orders Placed This [place]

## (undated) NOTE — LETTER
01/19/22    America Kapoor  [address] [id number]      Dear Danitza Signs records indicate that you have outstanding lab work and or testing that was ordered for you and has not yet been completed:  Orders Placed This Encounter